# Patient Record
Sex: FEMALE | Race: BLACK OR AFRICAN AMERICAN | NOT HISPANIC OR LATINO | Employment: FULL TIME | URBAN - METROPOLITAN AREA
[De-identification: names, ages, dates, MRNs, and addresses within clinical notes are randomized per-mention and may not be internally consistent; named-entity substitution may affect disease eponyms.]

---

## 2021-09-23 LAB
BILIRUB SERPL-MCNC: NEGATIVE MG/DL
BLOOD URINE, POC: NEGATIVE
CLARITY, POC UA: CLEAR
COLOR, POC UA: YELLOW
GLUCOSE UR QL STRIP: NEGATIVE
KETONES UR QL STRIP: NEGATIVE
LEUKOCYTE EST, POC UA: NEGATIVE
NITRITE, POC UA: NEGATIVE
PH, POC UA: 5.5
POC BETA-HCG (QUAL): NEGATIVE
PROTEIN, POC: NORMAL
SPECIFIC GRAVITY, POC UA: NORMAL
UROBILINOGEN, POC UA: NORMAL

## 2022-04-12 ENCOUNTER — HISTORICAL (OUTPATIENT)
Dept: ADMINISTRATIVE | Facility: HOSPITAL | Age: 22
End: 2022-04-12
Payer: MEDICAID

## 2022-04-30 VITALS
HEIGHT: 66 IN | WEIGHT: 110.25 LBS | BODY MASS INDEX: 17.72 KG/M2 | DIASTOLIC BLOOD PRESSURE: 81 MMHG | SYSTOLIC BLOOD PRESSURE: 122 MMHG | OXYGEN SATURATION: 100 %

## 2022-06-20 LAB
ALBUMIN SERPL-MCNC: 4.6 GM/DL (ref 3.5–5)
ALBUMIN/GLOB SERPL: 1.5 RATIO (ref 1.1–2)
ALP SERPL-CCNC: 72 UNIT/L (ref 40–150)
ALT SERPL-CCNC: 10 UNIT/L (ref 0–55)
AST SERPL-CCNC: 17 UNIT/L (ref 5–34)
B-HCG UR QL: NEGATIVE
BASOPHILS # BLD AUTO: 0.08 X10(3)/MCL (ref 0–0.2)
BASOPHILS NFR BLD AUTO: 1 %
BILIRUBIN DIRECT+TOT PNL SERPL-MCNC: 0.4 MG/DL
BUN SERPL-MCNC: 9 MG/DL (ref 7–18.7)
CALCIUM SERPL-MCNC: 9.9 MG/DL (ref 8.4–10.2)
CHLORIDE SERPL-SCNC: 105 MMOL/L (ref 98–107)
CO2 SERPL-SCNC: 27 MMOL/L (ref 22–29)
CREAT SERPL-MCNC: 0.82 MG/DL (ref 0.55–1.02)
CTP QC/QA: YES
EOSINOPHIL # BLD AUTO: 0.39 X10(3)/MCL (ref 0–0.9)
EOSINOPHIL NFR BLD AUTO: 4.7 %
ERYTHROCYTE [DISTWIDTH] IN BLOOD BY AUTOMATED COUNT: 13.8 % (ref 11.5–17)
GLOBULIN SER-MCNC: 3.1 GM/DL (ref 2.4–3.5)
GLUCOSE SERPL-MCNC: 87 MG/DL (ref 74–100)
HCT VFR BLD AUTO: 38.7 % (ref 37–47)
HGB BLD-MCNC: 12.1 GM/DL (ref 12–16)
IMM GRANULOCYTES # BLD AUTO: 0.01 X10(3)/MCL (ref 0–0.02)
IMM GRANULOCYTES NFR BLD AUTO: 0.1 % (ref 0–0.43)
LIPASE SERPL-CCNC: 11 U/L
LYMPHOCYTES # BLD AUTO: 3.98 X10(3)/MCL (ref 0.6–4.6)
LYMPHOCYTES NFR BLD AUTO: 48 %
MCH RBC QN AUTO: 25.6 PG (ref 27–31)
MCHC RBC AUTO-ENTMCNC: 31.3 MG/DL (ref 33–36)
MCV RBC AUTO: 82 FL (ref 80–94)
MONOCYTES # BLD AUTO: 0.5 X10(3)/MCL (ref 0.1–1.3)
MONOCYTES NFR BLD AUTO: 6 %
NEUTROPHILS # BLD AUTO: 3.3 X10(3)/MCL (ref 2.1–9.2)
NEUTROPHILS NFR BLD AUTO: 40.2 %
NRBC BLD AUTO-RTO: 0 %
PLATELET # BLD AUTO: 237 X10(3)/MCL (ref 130–400)
PMV BLD AUTO: 10.9 FL (ref 9.4–12.4)
POTASSIUM SERPL-SCNC: 3.9 MMOL/L (ref 3.5–5.1)
PROT SERPL-MCNC: 7.7 GM/DL (ref 6.4–8.3)
RBC # BLD AUTO: 4.72 X10(6)/MCL (ref 4.2–5.4)
SODIUM SERPL-SCNC: 141 MMOL/L (ref 136–145)
WBC # SPEC AUTO: 8.3 X10(3)/MCL (ref 4.5–11.5)

## 2022-06-20 PROCEDURE — 83690 ASSAY OF LIPASE: CPT | Performed by: FAMILY MEDICINE

## 2022-06-20 PROCEDURE — 99284 EMERGENCY DEPT VISIT MOD MDM: CPT | Mod: 25

## 2022-06-20 PROCEDURE — 85025 COMPLETE CBC W/AUTO DIFF WBC: CPT | Performed by: FAMILY MEDICINE

## 2022-06-20 PROCEDURE — 80053 COMPREHEN METABOLIC PANEL: CPT | Performed by: FAMILY MEDICINE

## 2022-06-20 PROCEDURE — 81025 URINE PREGNANCY TEST: CPT | Performed by: STUDENT IN AN ORGANIZED HEALTH CARE EDUCATION/TRAINING PROGRAM

## 2022-06-20 PROCEDURE — 36415 COLL VENOUS BLD VENIPUNCTURE: CPT | Performed by: FAMILY MEDICINE

## 2022-06-21 ENCOUNTER — HOSPITAL ENCOUNTER (EMERGENCY)
Facility: HOSPITAL | Age: 22
Discharge: HOME | End: 2022-06-21
Attending: FAMILY MEDICINE
Payer: MEDICAID

## 2022-06-21 VITALS
RESPIRATION RATE: 19 BRPM | BODY MASS INDEX: 17.33 KG/M2 | SYSTOLIC BLOOD PRESSURE: 122 MMHG | TEMPERATURE: 99 F | OXYGEN SATURATION: 100 % | HEART RATE: 65 BPM | WEIGHT: 107.81 LBS | DIASTOLIC BLOOD PRESSURE: 64 MMHG | HEIGHT: 66 IN

## 2022-06-21 DIAGNOSIS — R10.9 ABDOMINAL PAIN, UNSPECIFIED ABDOMINAL LOCATION: Primary | ICD-10-CM

## 2022-06-21 RX ORDER — ONDANSETRON 4 MG/1
4 TABLET, FILM COATED ORAL EVERY 6 HOURS
Qty: 20 TABLET | Refills: 0 | Status: SHIPPED | OUTPATIENT
Start: 2022-06-21

## 2022-06-21 RX ORDER — FAMOTIDINE 20 MG/1
20 TABLET, FILM COATED ORAL 2 TIMES DAILY
Qty: 20 TABLET | Refills: 0 | Status: SHIPPED | OUTPATIENT
Start: 2022-06-21 | End: 2023-06-22

## 2022-06-21 RX ORDER — DICYCLOMINE HYDROCHLORIDE 20 MG/1
20 TABLET ORAL 2 TIMES DAILY
Qty: 20 TABLET | Refills: 0 | Status: SHIPPED | OUTPATIENT
Start: 2022-06-21 | End: 2022-07-01

## 2022-06-21 NOTE — Clinical Note
"Lefty Laraмарина" Arnaldo was seen and treated in our emergency department on 6/20/2022.  She may return to work on 06/22/2022.       If you have any questions or concerns, please don't hesitate to call.      GOSIA Alejandro RN    "

## 2022-06-21 NOTE — DISCHARGE INSTRUCTIONS
You came into the emergency department for abdominal pain.  Your labs were all normal.  Your pregnancy was negative.    You will be discharged with multiple medications including Pepcid (helps with acid reflux/GERD), Zofran (cells with nausea), Bentyl (helps with crampy abdominal pain and diarrhea).    Return to the emergency department for worsening abdominal pain that is not improving, not able to eat any food or water, nonstop vomiting, blood in her stool.  Otherwise please follow-up your primary care doctor for re-evaluation.

## 2022-06-21 NOTE — ED PROVIDER NOTES
Name: Lefty Mcintosh   Age: 22 y.o.  Sex: female    Chief complaint:   Chief Complaint   Patient presents with    Abdominal Pain     Pt reports intermittent abd pain/diarrhea x 2 weeks, abd pain after eating, vss.     Nausea    Diarrhea      Patient arrived with: Private  History obtained from: Patient    Subjective:   22-year-old female who presents to the emergency department for abdominal pain.  Patient said her symptoms have been going on for approximately 2 weeks.  Pain is diffuse in nature but worse in the epigastric.  Pain is crampy in sensation, waxes and wanes, nonradiating, usually worse after p.o. intake.  Complains of nausea with no episodes of vomiting.  Has alternating diarrhea and constipation.  Denies dysuria, hematuria.  Last menstrual period was 1 week ago and unchanged.  No odor or abnormal discharge.  Denies fever, chills, sweats, cough, sore throat, runny nose.    No past medical history on file.  No past surgical history on file.  Social History     Socioeconomic History    Marital status: Single   Tobacco Use    Smoking status: Never Smoker    Smokeless tobacco: Never Used     Review of patient's allergies indicates:  No Known Allergies     Review of Systems   Constitutional: Negative for diaphoresis and fever.   HENT: Negative for congestion and sore throat.    Eyes: Negative for pain and discharge.   Respiratory: Negative for cough and shortness of breath.    Cardiovascular: Negative for chest pain and palpitations.   Gastrointestinal: Positive for constipation and diarrhea. Negative for vomiting.   Genitourinary: Negative for dysuria and hematuria.   Musculoskeletal: Negative for back pain and myalgias.   Skin: Negative for itching and rash.   Neurological: Negative for weakness and headaches.          Objective:     Vitals:    06/20/22 2127   BP: 128/66   Pulse: 63   Resp: 18   Temp: 98.6 °F (37 °C)        Physical Exam  Constitutional:       Appearance: She is not toxic-appearing.    HENT:      Head: Normocephalic and atraumatic.   Cardiovascular:      Rate and Rhythm: Regular rhythm.      Pulses: Normal pulses.   Pulmonary:      Effort: Pulmonary effort is normal.      Breath sounds: Normal breath sounds.   Abdominal:      General: Abdomen is flat. Bowel sounds are normal.      Palpations: Abdomen is soft.      Tenderness: There is no right CVA tenderness or left CVA tenderness.   Musculoskeletal:         General: No deformity. Normal range of motion.      Cervical back: Normal range of motion and neck supple.   Skin:     General: Skin is warm and dry.   Neurological:      General: No focal deficit present.      Mental Status: She is alert and oriented to person, place, and time.   Psychiatric:         Mood and Affect: Mood normal.         Behavior: Behavior normal.          Records:  Nursing records and triage records reviewed  Prior records reviewed    Medical decision making:   Presents to emergency department for abdominal pain and alternating diarrhea and constipation.  Patient is stable and nontoxic appearing.  Vital signs are within normal limits.  Abdomen is soft with no tenderness to palpation.  Will get abdominal labs for evaluation.  Patient is comfortable does not need symptomatic management at this time.    ED Course as of 06/21/22 0128   Mon Jun 20, 2022   2232 My interpretation of labs.  No leukocytosis, anemia, thrombocytopenia.  No severe electrolyte abnormality.  No ADRI hyperglycemia.  Liver enzymes are within normal limits.  Pregnancy negative. [RK]      ED Course User Index  [RK] Morgan Izquierdo MD          EKG:       Procedures       Diagnosis:  Final diagnoses:  [R10.9] Abdominal pain, unspecified abdominal location (Primary)     ED Prescriptions     Medication Sig Dispense Start Date End Date Auth. Provider    dicyclomine (BENTYL) 20 mg tablet Take 1 tablet (20 mg total) by mouth 2 (two) times daily. for 10 days 20 tablet 6/21/2022 7/1/2022 Morgan Izquierdo,  MD    ondansetron (ZOFRAN) 4 MG tablet Take 1 tablet (4 mg total) by mouth every 6 (six) hours. 20 tablet 6/21/2022  Morgan Izquierdo MD    famotidine (PEPCID) 20 MG tablet Take 1 tablet (20 mg total) by mouth 2 (two) times daily. for 10 days 20 tablet 6/21/2022 7/1/2022 Morgan Izquierdo MD        Follow-up Information     Follow up With Specialties Details Why Contact Info    PCP  Schedule an appointment as soon as possible for a visit in 1 week Follow up with your primary care doctor for re-evaluation Follow up with your primary care doctor for re-evaluation          Morgan Izquierdo M.D.  Emergency Medicine Physician     (Please note that this chart was completed via voice to text dictation. There may be typographical errors or substitutions that are unintentional, or uncorrected. Every attempt was made to proofread the chart prior to completion. If there are any questions, please contact the physician for final clarification).       Morgan Izquierdo MD  06/21/22 0129

## 2022-09-20 ENCOUNTER — HOSPITAL ENCOUNTER (EMERGENCY)
Facility: HOSPITAL | Age: 22
Discharge: HOME | End: 2022-09-20
Attending: SPECIALIST
Payer: MEDICAID

## 2022-09-20 VITALS
SYSTOLIC BLOOD PRESSURE: 109 MMHG | TEMPERATURE: 100 F | DIASTOLIC BLOOD PRESSURE: 76 MMHG | BODY MASS INDEX: 17.68 KG/M2 | HEART RATE: 122 BPM | OXYGEN SATURATION: 98 % | WEIGHT: 110 LBS | HEIGHT: 66 IN | RESPIRATION RATE: 20 BRPM

## 2022-09-20 DIAGNOSIS — J06.9 VIRAL URI WITH COUGH: Primary | ICD-10-CM

## 2022-09-20 PROCEDURE — 25000003 PHARM REV CODE 250: Performed by: SPECIALIST

## 2022-09-20 PROCEDURE — 96372 THER/PROPH/DIAG INJ SC/IM: CPT | Performed by: SPECIALIST

## 2022-09-20 PROCEDURE — 99284 EMERGENCY DEPT VISIT MOD MDM: CPT

## 2022-09-20 PROCEDURE — 63600175 PHARM REV CODE 636 W HCPCS: Performed by: SPECIALIST

## 2022-09-20 RX ORDER — DEXAMETHASONE SODIUM PHOSPHATE 4 MG/ML
4 INJECTION, SOLUTION INTRA-ARTICULAR; INTRALESIONAL; INTRAMUSCULAR; INTRAVENOUS; SOFT TISSUE
Status: COMPLETED | OUTPATIENT
Start: 2022-09-20 | End: 2022-09-20

## 2022-09-20 RX ORDER — GUAIFENESIN/DEXTROMETHORPHAN 100-10MG/5
10 SYRUP ORAL ONCE
Status: COMPLETED | OUTPATIENT
Start: 2022-09-20 | End: 2022-09-20

## 2022-09-20 RX ADMIN — DEXAMETHASONE SODIUM PHOSPHATE 4 MG: 4 INJECTION, SOLUTION INTRA-ARTICULAR; INTRALESIONAL; INTRAMUSCULAR; INTRAVENOUS; SOFT TISSUE at 10:09

## 2022-09-20 RX ADMIN — GUAIFENESIN AND DEXTROMETHORPHAN 10 ML: 100; 10 SYRUP ORAL at 10:09

## 2022-09-21 NOTE — ED PROVIDER NOTES
Encounter Date: 9/20/2022       History     Chief Complaint   Patient presents with    Cough     Patient recently diagnosed with URI yesterday. Covid and strep -. States she had 2 episodes with blood in her sputum after coughing.      Patient seen earlier today at a walk-in clinic and was diagnosed with upper respiratory infection and given prescriptions of Z-Dash, Medrol Dosepak and a cough syrup; these prescriptions have not been filled; patient continues to have cough and noticed some blood tinged sputum; no fever    The history is provided by the patient and a parent.   Review of patient's allergies indicates:  Not on File  History reviewed. No pertinent past medical history.  History reviewed. No pertinent surgical history.  History reviewed. No pertinent family history.     Review of Systems   Constitutional: Negative.    HENT: Negative.     Respiratory: Negative.     Cardiovascular: Negative.    Gastrointestinal: Negative.    Musculoskeletal: Negative.    Skin: Negative.    Neurological: Negative.    All other systems reviewed and are negative.    Physical Exam     Initial Vitals [09/20/22 2212]   BP Pulse Resp Temp SpO2   109/76 (!) 122 20 99.6 °F (37.6 °C) 98 %      MAP       --         Physical Exam    Nursing note and vitals reviewed.  Constitutional: She appears well-developed and well-nourished.   HENT:   Head: Normocephalic and atraumatic.   Nose: Nose normal.   Mouth/Throat: Oropharynx is clear and moist.   Eyes: EOM are normal. Pupils are equal, round, and reactive to light.   Neck: Neck supple.   Normal range of motion.  Cardiovascular:  Regular rhythm, normal heart sounds and intact distal pulses.   Tachycardia present.         Pulmonary/Chest: Breath sounds normal. No respiratory distress. She has no wheezes.   Abdominal: Abdomen is soft. Bowel sounds are normal.   Musculoskeletal:         General: Normal range of motion.      Cervical back: Normal range of motion and neck supple.     Neurological:  "She is alert and oriented to person, place, and time. She has normal strength.   Skin: Skin is warm and dry.       ED Course   Procedures  Labs Reviewed - No data to display       Imaging Results    None          Medications   dexamethasone injection 4 mg (4 mg Intramuscular Given 9/20/22 2234)   dextromethorphan-guaiFENesin  mg/5 ml liquid 10 mL (10 mLs Oral Given 9/20/22 2234)                            Patient Vitals for the past 24 hrs:   BP Temp Pulse Resp SpO2 Height Weight   09/20/22 2212 109/76 99.6 °F (37.6 °C) (!) 122 20 98 % 5' 6" (1.676 m) 110 lb (49.9 kg)   Patient and her mother agree to  the prescriptions in the morning    Clinical Impression:   Final diagnoses:  [J06.9] Viral URI with cough (Primary)        ED Disposition Condition    Discharge Stable          ED Prescriptions    None       Follow-up Information       Follow up With Specialties Details Why Contact Info    [facility] - Emergency Dept Emergency Medicine  As needed [address] [address] [phone]             Rodney Orellana MD  09/20/22 5158    "

## 2022-09-21 NOTE — ED NOTES
Patient c/o sore throat and coughing that started on yesterday. She went to urgent care on yesterday and was tested for covid and strept, results were negative. Denies body aches and shortness of breath. No acute distress noted.

## 2022-09-22 ENCOUNTER — HISTORICAL (OUTPATIENT)
Dept: ADMINISTRATIVE | Facility: HOSPITAL | Age: 22
End: 2022-09-22
Payer: MEDICAID

## 2022-10-30 ENCOUNTER — HOSPITAL ENCOUNTER (EMERGENCY)
Facility: HOSPITAL | Age: 22
Discharge: HOME | End: 2022-10-30
Attending: STUDENT IN AN ORGANIZED HEALTH CARE EDUCATION/TRAINING PROGRAM
Payer: MEDICAID

## 2022-10-30 VITALS
HEIGHT: 66 IN | TEMPERATURE: 99 F | BODY MASS INDEX: 17.68 KG/M2 | WEIGHT: 110 LBS | SYSTOLIC BLOOD PRESSURE: 138 MMHG | HEART RATE: 92 BPM | OXYGEN SATURATION: 100 % | RESPIRATION RATE: 18 BRPM | DIASTOLIC BLOOD PRESSURE: 89 MMHG

## 2022-10-30 DIAGNOSIS — Z34.90 PREGNANCY, UNSPECIFIED GESTATIONAL AGE: Primary | ICD-10-CM

## 2022-10-30 DIAGNOSIS — Z32.01 POSITIVE PREGNANCY TEST: ICD-10-CM

## 2022-10-30 LAB — B-HCG SERPL QL: POSITIVE

## 2022-10-30 PROCEDURE — 99282 EMERGENCY DEPT VISIT SF MDM: CPT

## 2022-10-30 PROCEDURE — 81025 URINE PREGNANCY TEST: CPT | Performed by: STUDENT IN AN ORGANIZED HEALTH CARE EDUCATION/TRAINING PROGRAM

## 2022-10-31 NOTE — ED PROVIDER NOTES
Encounter Date: 10/30/2022       History     Chief Complaint   Patient presents with    Possible Pregnancy     Pt reports positive home test yesterday, lmp Sep 19. Pt reports that she needs pregnancy test results from hospital for work documentation.      Patient is a 22-year-old  female  at approximately 6 weeks gestation who presented to the ER today eating a positive pregnancy test.  Patient states she already knows she is pregnant but her work is requiring 1 from hospital.  Patient denies any nausea, vomiting, fever, chills, chest pain, shortness of breath abdominal pain, diarrhea, constipation.    Review of patient's allergies indicates:  No Known Allergies  No past medical history on file.  No past surgical history on file.  No family history on file.  Tobacco Use    Smokeless tobacco: Never     Review of Systems   Constitutional:  Negative for chills, fatigue and fever.   HENT:  Negative for congestion, sore throat and trouble swallowing.    Eyes:  Negative for pain and visual disturbance.   Respiratory:  Negative for cough, shortness of breath and wheezing.    Cardiovascular:  Negative for chest pain and palpitations.   Gastrointestinal:  Negative for abdominal pain, blood in stool, constipation, diarrhea, nausea and vomiting.   Genitourinary:  Negative for dysuria and hematuria.   Musculoskeletal:  Negative for back pain and myalgias.   Skin:  Negative for rash and wound.   Neurological:  Negative for seizures, syncope and headaches.   Psychiatric/Behavioral:  Negative for confusion. The patient is not nervous/anxious.      Physical Exam     Initial Vitals [10/30/22 2021]   BP Pulse Resp Temp SpO2   138/89 92 18 98.6 °F (37 °C) 100 %      MAP       --         Physical Exam    Nursing note and vitals reviewed.  Constitutional: She appears well-developed and well-nourished. She is not diaphoretic. No distress.   HENT:   Head: Normocephalic.   Right Ear: External ear normal.   Left Ear:  External ear normal.   Nose: Nose normal.   Eyes: Conjunctivae and EOM are normal. Right eye exhibits no discharge. Left eye exhibits no discharge. No scleral icterus.   Neck:   Normal range of motion.  Cardiovascular:  Normal rate, regular rhythm and normal heart sounds.     Exam reveals no gallop and no friction rub.       No murmur heard.  Pulmonary/Chest: Breath sounds normal. No stridor. No respiratory distress. She has no wheezes. She has no rhonchi. She has no rales.   Abdominal: Abdomen is soft. She exhibits no distension. There is no abdominal tenderness. There is no rebound and no guarding.   Musculoskeletal:         General: No tenderness or edema. Normal range of motion.      Cervical back: Normal range of motion.     Neurological: She is alert. No cranial nerve deficit.   Skin: Skin is warm. No rash noted. No erythema.   Psychiatric: She has a normal mood and affect. Her behavior is normal.       ED Course   Procedures  Labs Reviewed   PREGNANCY TEST, URINE RAPID - Abnormal; Notable for the following components:       Result Value    Beta hCG Qualitative, Urine Positive (*)     All other components within normal limits          Imaging Results    None          Medications - No data to display  Medical Decision Making:   Initial Assessment:   Overall well-appearing 22-year-old female  Clinical Tests:   Lab Tests: Ordered and Reviewed  The following lab test(s) were unremarkable: UPT  ED Management:  Vital signs stable patient is afebrile  Positive pregnancy test provided to patient   Take prenatal vitamin daily   Call OBGYN for an appointment   Return precautions discussed and follow-up PCP is recommended                        Clinical Impression:   Final diagnoses:  [Z34.90] Pregnancy, unspecified gestational age (Primary)  [Z32.01] Positive pregnancy test        ED Disposition Condition    Discharge Stable          ED Prescriptions    None       Follow-up Information       Follow up With Specialties  Details Why Contact Info    [facility] - Emergency Dept Emergency Medicine  If symptoms worsen [address] [facility] [id number] [phone]             Delio Wheeler MD  10/30/22 9096

## 2022-11-01 ENCOUNTER — HOSPITAL ENCOUNTER (EMERGENCY)
Facility: HOSPITAL | Age: 22
Discharge: HOME | End: 2022-11-02
Attending: STUDENT IN AN ORGANIZED HEALTH CARE EDUCATION/TRAINING PROGRAM
Payer: MEDICAID

## 2022-11-01 DIAGNOSIS — N93.9 VAGINAL BLEEDING: ICD-10-CM

## 2022-11-01 DIAGNOSIS — O20.0 THREATENED MISCARRIAGE: Primary | ICD-10-CM

## 2022-11-01 DIAGNOSIS — O03.9 MISCARRIAGE: ICD-10-CM

## 2022-11-01 LAB
ALBUMIN SERPL-MCNC: 4.7 GM/DL (ref 3.5–5)
ALBUMIN/GLOB SERPL: 1.3 RATIO (ref 1.1–2)
ALP SERPL-CCNC: 72 UNIT/L (ref 40–150)
ALT SERPL-CCNC: 16 UNIT/L (ref 0–55)
APPEARANCE UR: CLEAR
AST SERPL-CCNC: 21 UNIT/L (ref 5–34)
B-HCG FREE SERPL-ACNC: 5116.97 MIU/ML
B-HCG SERPL QL: POSITIVE
BACTERIA #/AREA URNS AUTO: ABNORMAL /HPF
BASOPHILS # BLD AUTO: 0.08 X10(3)/MCL (ref 0–0.2)
BASOPHILS NFR BLD AUTO: 0.7 %
BILIRUB UR QL STRIP.AUTO: NEGATIVE MG/DL
BILIRUBIN DIRECT+TOT PNL SERPL-MCNC: 0.3 MG/DL
BUN SERPL-MCNC: 13.3 MG/DL (ref 7–18.7)
CALCIUM SERPL-MCNC: 9.6 MG/DL (ref 8.4–10.2)
CHLORIDE SERPL-SCNC: 105 MMOL/L (ref 98–107)
CO2 SERPL-SCNC: 24 MMOL/L (ref 22–29)
COLOR UR AUTO: YELLOW
CREAT SERPL-MCNC: 0.89 MG/DL (ref 0.55–1.02)
EOSINOPHIL # BLD AUTO: 0.32 X10(3)/MCL (ref 0–0.9)
EOSINOPHIL NFR BLD AUTO: 2.8 %
ERYTHROCYTE [DISTWIDTH] IN BLOOD BY AUTOMATED COUNT: 13.7 % (ref 11.5–17)
GFR SERPLBLD CREATININE-BSD FMLA CKD-EPI: >60 MLS/MIN/1.73/M2
GLOBULIN SER-MCNC: 3.5 GM/DL (ref 2.4–3.5)
GLUCOSE SERPL-MCNC: 84 MG/DL (ref 74–100)
GLUCOSE UR QL STRIP.AUTO: NEGATIVE MG/DL
GROUP & RH: NORMAL
HCT VFR BLD AUTO: 37 % (ref 37–47)
HGB BLD-MCNC: 11.9 GM/DL (ref 12–16)
IMM GRANULOCYTES # BLD AUTO: 0.02 X10(3)/MCL (ref 0–0.04)
IMM GRANULOCYTES NFR BLD AUTO: 0.2 %
KETONES UR QL STRIP.AUTO: NEGATIVE MG/DL
LEUKOCYTE ESTERASE UR QL STRIP.AUTO: ABNORMAL UNIT/L
LYMPHOCYTES # BLD AUTO: 3.68 X10(3)/MCL (ref 0.6–4.6)
LYMPHOCYTES NFR BLD AUTO: 32.1 %
MCH RBC QN AUTO: 26.2 PG (ref 27–31)
MCHC RBC AUTO-ENTMCNC: 32.2 MG/DL (ref 33–36)
MCV RBC AUTO: 81.3 FL (ref 80–94)
MONOCYTES # BLD AUTO: 0.78 X10(3)/MCL (ref 0.1–1.3)
MONOCYTES NFR BLD AUTO: 6.8 %
NEUTROPHILS # BLD AUTO: 6.6 X10(3)/MCL (ref 2.1–9.2)
NEUTROPHILS NFR BLD AUTO: 57.4 %
NITRITE UR QL STRIP.AUTO: NEGATIVE
NRBC BLD AUTO-RTO: 0 %
PH UR STRIP.AUTO: 6.5 [PH]
PLATELET # BLD AUTO: 233 X10(3)/MCL (ref 130–400)
PMV BLD AUTO: 11.2 FL (ref 7.4–10.4)
POTASSIUM SERPL-SCNC: 3.6 MMOL/L (ref 3.5–5.1)
PROT SERPL-MCNC: 8.2 GM/DL (ref 6.4–8.3)
PROT UR QL STRIP.AUTO: ABNORMAL MG/DL
RBC # BLD AUTO: 4.55 X10(6)/MCL (ref 4.2–5.4)
RBC #/AREA URNS AUTO: <5 /HPF
RBC UR QL AUTO: ABNORMAL UNIT/L
SODIUM SERPL-SCNC: 136 MMOL/L (ref 136–145)
SP GR UR STRIP.AUTO: 1.03 (ref 1–1.03)
SQUAMOUS #/AREA URNS AUTO: <5 /HPF
UROBILINOGEN UR STRIP-ACNC: 1 MG/DL
WBC # SPEC AUTO: 11.5 X10(3)/MCL (ref 4.5–11.5)
WBC #/AREA URNS AUTO: 6 /HPF

## 2022-11-01 PROCEDURE — 86901 BLOOD TYPING SEROLOGIC RH(D): CPT | Performed by: EMERGENCY MEDICINE

## 2022-11-01 PROCEDURE — 36415 COLL VENOUS BLD VENIPUNCTURE: CPT

## 2022-11-01 PROCEDURE — 84702 CHORIONIC GONADOTROPIN TEST: CPT

## 2022-11-01 PROCEDURE — 36415 COLL VENOUS BLD VENIPUNCTURE: CPT | Performed by: EMERGENCY MEDICINE

## 2022-11-01 PROCEDURE — 81001 URINALYSIS AUTO W/SCOPE: CPT

## 2022-11-01 PROCEDURE — 81025 URINE PREGNANCY TEST: CPT

## 2022-11-01 PROCEDURE — 99284 EMERGENCY DEPT VISIT MOD MDM: CPT | Mod: 25

## 2022-11-01 PROCEDURE — 85025 COMPLETE CBC W/AUTO DIFF WBC: CPT

## 2022-11-01 PROCEDURE — 80053 COMPREHEN METABOLIC PANEL: CPT

## 2022-11-02 VITALS
OXYGEN SATURATION: 100 % | DIASTOLIC BLOOD PRESSURE: 59 MMHG | RESPIRATION RATE: 20 BRPM | WEIGHT: 110 LBS | SYSTOLIC BLOOD PRESSURE: 112 MMHG | HEIGHT: 66 IN | HEART RATE: 72 BPM | TEMPERATURE: 98 F | BODY MASS INDEX: 17.68 KG/M2

## 2022-11-02 NOTE — FIRST PROVIDER EVALUATION
"Medical screening examination initiated.  I have conducted a focused provider triage encounter, findings are as follows:    Brief history of present illness:  22 year old female presents to ER with c/o vaginal bleeding. LMP 22.     Vitals:    22   BP: 116/69   Pulse: 76   Resp: 16   Temp: 99.1 °F (37.3 °C)   SpO2: 100%   Weight: 49.9 kg (110 lb)   Height: 5' 6" (1.676 m)       Pertinent physical exam:  Awake and alert, NAD    Brief workup plan:  Labs, urine, upt     Preliminary workup initiated; this workup will be continued and followed by the physician or advanced practice provider that is assigned to the patient when roomed.  "

## 2022-11-02 NOTE — ED PROVIDER NOTES
Encounter Date: 11/1/2022       History     Chief Complaint   Patient presents with    Vaginal Bleeding     Pt c/o vaginal bleeding/spotting and recently found out she was pregnant. LMP 9/19/22. Denies abd pain/cramping.     Patient is a 22-year-old female  that presents with vaginal bleeding that has been present today. Associated symptoms abdominal cramp. Surrounding information is currently pregnant. Exacerbated by nothing. Relieved by nothing. Patient treatment prior to arrival none. Risk factors include none. Other history pertaining to this complaint nothing .       The history is provided by the patient. No  was used.   Review of patient's allergies indicates:  No Known Allergies  Past Medical History:   Diagnosis Date    Known health problems: none      Past Surgical History:   Procedure Laterality Date    none       History reviewed. No pertinent family history.  Social History     Tobacco Use    Smoking status: Never    Smokeless tobacco: Never   Substance Use Topics    Alcohol use: Not Currently    Drug use: Not Currently     Review of Systems   Constitutional:  Negative for fever.   Respiratory:  Negative for cough and shortness of breath.    Cardiovascular:  Negative for chest pain.   Gastrointestinal:  Negative for abdominal pain.   Genitourinary:  Positive for vaginal bleeding. Negative for difficulty urinating and dysuria.   Musculoskeletal:  Negative for gait problem.   Skin:  Negative for color change.   Neurological:  Negative for dizziness, speech difficulty and headaches.   Psychiatric/Behavioral:  Negative for hallucinations and suicidal ideas.    All other systems reviewed and are negative.    Physical Exam     Initial Vitals [11/01/22 2001]   BP Pulse Resp Temp SpO2   116/69 76 16 99.1 °F (37.3 °C) 100 %      MAP       --         Physical Exam    Nursing note and vitals reviewed.  Constitutional: She appears well-developed and well-nourished.   HENT:   Head: Normocephalic.    Eyes: EOM are normal.   Neck:   Normal range of motion.  Cardiovascular:  Normal rate, regular rhythm, normal heart sounds and intact distal pulses.           Pulmonary/Chest: Breath sounds normal. No respiratory distress.   Abdominal: Abdomen is soft. Bowel sounds are normal. There is no abdominal tenderness.   Musculoskeletal:         General: Normal range of motion.      Cervical back: Normal range of motion.     Neurological: She is alert and oriented to person, place, and time. She has normal strength.   Skin: Skin is warm and dry.   Psychiatric: She has a normal mood and affect. Her behavior is normal. Judgment and thought content normal.       ED Course   Procedures  Labs Reviewed   PREGNANCY TEST, URINE RAPID - Abnormal; Notable for the following components:       Result Value    Beta hCG Qualitative, Urine Positive (*)     All other components within normal limits   URINALYSIS, REFLEX TO URINE CULTURE - Abnormal; Notable for the following components:    Protein, UA Trace (*)     Blood, UA 3+ (*)     Leukocyte Esterase, UA Trace (*)     All other components within normal limits   HCG, QUANTITATIVE - Abnormal; Notable for the following components:    Beta Human Chorionic Gonadotropin Quantitative 5,116.97 (*)     All other components within normal limits   CBC WITH DIFFERENTIAL - Abnormal; Notable for the following components:    Hgb 11.9 (*)     MCH 26.2 (*)     MCHC 32.2 (*)     MPV 11.2 (*)     All other components within normal limits   URINALYSIS, MICROSCOPIC - Abnormal; Notable for the following components:    WBC, UA 6 (*)     All other components within normal limits   CBC W/ AUTO DIFFERENTIAL    Narrative:     The following orders were created for panel order CBC Auto Differential.  Procedure                               Abnormality         Status                     ---------                               -----------         ------                     CBC with Differential[[id number]]         Abnormal            Final result                 Please view results for these tests on the individual orders.   COMPREHENSIVE METABOLIC PANEL   GROUP & RH          Imaging Results              US OB <14 Wks, TransAbd, Single Gestation (Final result)  Result time 11/02/22 11:54:04   Procedure changed from US OB <14 Wks TransAbd & TransVag, Single Gestation (XPD)     Final result by Mike Christian MD (11/02/22 11:54:04)                   Impression:    Impression:    1. A gravid uterus is present with a gestational sac-like structure. The presumed gestational sac measures 5.4 mm which corresponds to an ultrasound estimated gestational age of 5 week 0 days. No yolk sac or fetal pole is identified at this time.    2. Recommend serial interval clinical laboratory and ultrasound follow up to confirm viable pregnancy. Details and other findings as noted above.    No significant discrepancy with overnight report.      Electronically signed by: Mike Christian  Date:    11/02/2022  Time:    11:54               Narrative:      Technique:First trimester ultrasound of the pelvis was performed with transabdominal images being obtained.    Comparison:None.    Clinical history:Vaginal bleeding.    FINDINGS:    Uterus:The uterus appears unremarkable. The uterus measures 6.1 cm in sagittal dimension by 4.2 cm in transverse dimension by 4.7 cm in AP dimension.    Intrauterine gestation:A gravid uterus is present with a gestational sac-like structure. The presumed gestational sac measures 5.4 mm which corresponds to an ultrasound estimated gestational age of 5 week 0 days. No yolk sac or fetal pole is identified at this time.    Gestational age:The LMP is 09/19/2022. The gestational age by LMP is 6 weeks 1 days. The Estimated Date of Confinement based on menstrual period is 06/26/2023.    Adnexa:No adnexal masses are seen.    Right Ovary:The right ovary is not visualized.    Left Ovary:The left ovary measures 3.1 x 2.1 x 2.1 cm. A  large likely corpus luteum cyst is seen which measures 1.6 x 1.3 x 1.1 cm.    Fluid:No free fluid is seen in the pelvis.                        Preliminary result by Mike Christian MD (11/02/22 00:44:49)                   Narrative:    START OF REPORT:  Technique: First trimester ultrasound of the pelvis was performed with transabdominal images being obtained.    Comparison: None.    Clinical history: Vaginal bleeding.    FINDINGS:  Uterus: The uterus appears unremarkable. The uterus measures 6.1 cm in sagittal dimension by 4.2 cm in transverse dimension by 4.7 cm in AP dimension.  Intrauterine gestation: A gravid uterus is present with a gestational sac-like structure. The presumed gestational sac measures 5.4 mm which corresponds to an ultrasound estimated gestational age of 5 week 0 days. No yolk sac or fetal pole is identified at this time.  Gestational age: The LMP is 09/19/2022. The gestational age by LMP is 6 weeks 1 days. The Estimated Date of Confinement based on menstrual period is 06/26/2023.    Adnexa: No adnexal masses are seen.  Right Ovary: The right ovary is not visualized.  Left Ovary: The left ovary measures 3.1 x 2.1 x 2.1 cm. A large likely corpus luteum cyst is seen which measures 1.6 x 1.3 x 1.1 cm.    Fluid: No free fluid is seen in the pelvis.      Impression:  1. A gravid uterus is present with a gestational sac-like structure. The presumed gestational sac measures 5.4 mm which corresponds to an ultrasound estimated gestational age of 5 week 0 days. No yolk sac or fetal pole is identified at this time.  2. Recommend serial interval clinical laboratory and ultrasound follow up to confirm viable pregnancy. Details and other findings as noted above.                          Preliminary result by Jerry Cali Jr., MD (11/02/22 00:44:49)                   Narrative:    START OF REPORT:  Technique: First trimester ultrasound of the pelvis was performed with transabdominal images being  obtained.    Comparison: None.    Clinical history: Vaginal bleeding.    FINDINGS:  Uterus: The uterus appears unremarkable. The uterus measures 6.1 cm in sagittal dimension by 4.2 cm in transverse dimension by 4.7 cm in AP dimension.  Intrauterine gestation: A gravid uterus is present with a gestational sac-like structure. The presumed gestational sac measures 5.4 mm which corresponds to an ultrasound estimated gestational age of 5 week 0 days. No yolk sac or fetal pole is identified at this time.  Gestational age: The LMP is 09/19/2022. The gestational age by LMP is 6 weeks 1 days. The Estimated Date of Confinement based on menstrual period is 06/26/2023.    Adnexa: No adnexal masses are seen.  Right Ovary: The right ovary is not visualized.  Left Ovary: The left ovary measures 3.1 x 2.1 x 2.1 cm. A large likely corpus luteum cyst is seen which measures 1.6 x 1.3 x 1.1 cm.    Fluid: No free fluid is seen in the pelvis.      Impression:  1. A gravid uterus is present with a gestational sac-like structure. The presumed gestational sac measures 5.4 mm which corresponds to an ultrasound estimated gestational age of 5 week 0 days. No yolk sac or fetal pole is identified at this time.  2. Recommend serial interval clinical laboratory and ultrasound follow up to confirm viable pregnancy. Details and other findings as noted above.                                         Medications - No data to display              ED Course as of 11/02/22 1741   Wed Nov 02, 2022   0038 Needs to follow up with OB Dr Mcarthur for repeat HCG level and u/s. [CL]   0311 HCG Quant(!): 5,116.97 [RP]      ED Course User Index  [CL] TONI Fuentes  [RP] Skyler Benitez MD                 Clinical Impression:   Final diagnoses:  [O20.0] Threatened miscarriage (Primary)  [N93.9] Vaginal bleeding  [O03.9] Miscarriage      ED Disposition Condition    Discharge Stable          ED Prescriptions    None       Follow-up Information       Follow up  With Specialties Details Why Contact Info    Your Obstetrician/Gynecologist  Call in 2 days ed follow up     Santhosh Mcarthur MD Obstetrics   [address]  [facility], [address]  [facility] [address]  [phone]               TONI Fuentes  11/02/22 0039       TONI Fuentes  11/02/22 4629

## 2022-11-02 NOTE — DISCHARGE INSTRUCTIONS
Your B-HCG or pregnancy hormone level today was 5100.   You will need a repeat hormone level in 2 days.      Follow up with Dr. Mcarthur.     Return to the emergency department if you have any worsening or severe pain, fever, nausea, vomiting, difficulty breathing, or any other symptoms.

## 2022-11-02 NOTE — ED TRIAGE NOTES
(Pt c/o vaginal bleeding/spotting and recently found out she was pregnant. LMP 9/19/22. Denies abd pain/cramping

## 2022-11-02 NOTE — ED NOTES
Assumed care of patient at this time. Pt denies needs, ambulated with steady gait to bathroom. NAD at this time. Will continue to monitor.

## 2023-02-14 DIAGNOSIS — Z12.4 ENCOUNTER FOR PAPANICOLAOU SMEAR OF CERVIX: Primary | ICD-10-CM

## 2023-06-20 ENCOUNTER — PATIENT MESSAGE (OUTPATIENT)
Dept: RESEARCH | Facility: HOSPITAL | Age: 23
End: 2023-06-20
Payer: MEDICAID

## 2023-06-22 ENCOUNTER — OFFICE VISIT (OUTPATIENT)
Dept: URGENT CARE | Facility: CLINIC | Age: 23
End: 2023-06-22
Payer: MEDICAID

## 2023-06-22 VITALS
OXYGEN SATURATION: 98 % | DIASTOLIC BLOOD PRESSURE: 82 MMHG | RESPIRATION RATE: 16 BRPM | WEIGHT: 112.5 LBS | TEMPERATURE: 98 F | BODY MASS INDEX: 19.21 KG/M2 | SYSTOLIC BLOOD PRESSURE: 117 MMHG | HEART RATE: 83 BPM | HEIGHT: 64 IN

## 2023-06-22 DIAGNOSIS — R10.9 ABDOMINAL PAIN, UNSPECIFIED ABDOMINAL LOCATION: ICD-10-CM

## 2023-06-22 DIAGNOSIS — N89.8 VAGINAL DISCHARGE: Primary | ICD-10-CM

## 2023-06-22 PROBLEM — S06.9X0A TRAUMATIC BRAIN INJURY WITHOUT LOSS OF CONSCIOUSNESS: Status: ACTIVE | Noted: 2023-06-22

## 2023-06-22 PROBLEM — J30.9 ALLERGIC RHINITIS: Status: ACTIVE | Noted: 2021-02-05

## 2023-06-22 LAB
B-HCG SERPL QL: NEGATIVE
C TRACH DNA SPEC QL NAA+PROBE: NOT DETECTED
CLUE CELLS VAG QL WET PREP: NORMAL
N GONORRHOEA DNA SPEC QL NAA+PROBE: NOT DETECTED
T VAGINALIS VAG QL WET PREP: NORMAL
WBC #/AREA VAG WET PREP: NORMAL
YEAST SPEC QL WET PREP: NORMAL

## 2023-06-22 PROCEDURE — 81025 URINE PREGNANCY TEST: CPT | Performed by: NURSE PRACTITIONER

## 2023-06-22 PROCEDURE — 99213 PR OFFICE/OUTPT VISIT, EST, LEVL III, 20-29 MIN: ICD-10-PCS | Mod: S$PBB,,, | Performed by: NURSE PRACTITIONER

## 2023-06-22 PROCEDURE — 99214 OFFICE O/P EST MOD 30 MIN: CPT | Mod: PBBFAC | Performed by: NURSE PRACTITIONER

## 2023-06-22 PROCEDURE — 99213 OFFICE O/P EST LOW 20 MIN: CPT | Mod: S$PBB,,, | Performed by: NURSE PRACTITIONER

## 2023-06-22 PROCEDURE — 87591 N.GONORRHOEAE DNA AMP PROB: CPT | Performed by: NURSE PRACTITIONER

## 2023-06-22 PROCEDURE — 87210 SMEAR WET MOUNT SALINE/INK: CPT | Performed by: NURSE PRACTITIONER

## 2023-06-22 RX ORDER — FLUCONAZOLE 200 MG/1
200 TABLET ORAL
COMMUNITY
Start: 2023-06-10

## 2023-06-22 RX ORDER — VALACYCLOVIR HYDROCHLORIDE 1 G/1
1000 TABLET, FILM COATED ORAL 2 TIMES DAILY
COMMUNITY
Start: 2023-05-24 | End: 2023-06-22

## 2023-06-22 RX ORDER — METRONIDAZOLE 500 MG/1
500 TABLET ORAL 2 TIMES DAILY
COMMUNITY
Start: 2023-06-10

## 2023-06-22 NOTE — PATIENT INSTRUCTIONS
- abstain from sex until all results are known  - urine tests take 1 day to result  - vaginal swab tests are a same day result  - blood tests are a same day result  - this clinic will ONLY call you for POSITIVE = ABNORMAL results. We will not call you to tell you tests are NEGATIVE = NORMAL.  - if you need medication to treat any conditions, it was either prescribed to you today and sent to your pharmacy, or it will be sent when providers view abnormal results.  - if any of your tests are abnormal, we will call you with a plan of care.  - always require condoms  - partners will need treatment for any +STDs on your testing. They have to have their own visit, we do not automatically treat them.

## 2023-06-22 NOTE — LETTER
June 22, 2023      [facility]  [facility] [facility] [address]  Phone: [phone]       Patient: Lefty Mcintosh   YOB: 2000  Date of Visit: 06/22/2023    To Whom It May Concern:    Carolina Mcintosh  was at [facility] on 06/22/2023. The patient may return to work/school on 6/23/23. If you have any questions or concerns, or if I can be of further assistance, please do not hesitate to contact me.    Sincerely,    SHERYL Newton NP

## 2023-06-27 ENCOUNTER — PATIENT MESSAGE (OUTPATIENT)
Dept: RESEARCH | Facility: HOSPITAL | Age: 23
End: 2023-06-27
Payer: MEDICAID

## 2023-08-17 NOTE — PROGRESS NOTES
"Subjective:      Patient ID: Lefty Mcintosh is a 23 y.o. female.    Vitals:  height is 5' 4.17" (1.63 m) and weight is 51 kg (112 lb 8 oz). Her temperature is 98.1 °F (36.7 °C). Her blood pressure is 117/82 and her pulse is 83. Her respiration is 16 and oxygen saturation is 98%.     Chief Complaint: Dx with BV ~ 6/7 (Seen at [facility] called with results and Rx for BV (did not complete Rx.) Also states given Rocephin injection at time of visit. Does not know results of all testing, c/o of persistent discharge/itching. )    HPI as stated in CC. 6/10/23 rec'd Diflucan x3 doses and Flagyl. May 2023 took Valacyclovir. Pt is fine with repeat testing.  ROS   Objective:     Physical Exam   Constitutional: She is oriented to person, place, and time.  Non-toxic appearance. She does not appear ill. No distress. normal  Eyes: Conjunctivae are normal.   Pulmonary/Chest: Effort normal.   Abdominal: Normal appearance.   Musculoskeletal: Normal range of motion.         General: Normal range of motion.   Neurological: She is alert and oriented to person, place, and time.   Skin: Skin is warm, dry and not diaphoretic.   Psychiatric: Her behavior is normal. Mood, judgment and thought content normal.   Nursing note and vitals reviewed.    Assessment:     1. Vaginal discharge    2. Abdominal pain, unspecified abdominal location        Plan:       Vaginal discharge  -     Chlamydia/GC, PCR  -     Wet Prep, Genital  -     HCG Qualitative Urine    Abdominal pain, unspecified abdominal location  -     HCG Qualitative Urine      - abstain from sex until all results are known  - urine tests take 1 day to result  - vaginal swab tests are a same day result  - blood tests are a same day result  - this clinic will ONLY call you for POSITIVE = ABNORMAL results. We will not call you to tell you tests are NEGATIVE = NORMAL.  - if you need medication to treat any conditions, it was either prescribed to you today and sent to your pharmacy, or it will " Detail Level: Zone be sent when providers view abnormal results.  - if any of your tests are abnormal, we will call you with a plan of care.  - always require condoms  - partners will need treatment for any +STDs on your testing. They have to have their own visit, we do not automatically treat them.

## 2024-05-09 ENCOUNTER — OFFICE VISIT (OUTPATIENT)
Dept: URGENT CARE | Facility: CLINIC | Age: 24
End: 2024-05-09
Payer: MEDICAID

## 2024-05-09 VITALS
HEIGHT: 64 IN | TEMPERATURE: 98 F | RESPIRATION RATE: 18 BRPM | BODY MASS INDEX: 18.81 KG/M2 | SYSTOLIC BLOOD PRESSURE: 112 MMHG | HEART RATE: 75 BPM | WEIGHT: 110.19 LBS | DIASTOLIC BLOOD PRESSURE: 68 MMHG | OXYGEN SATURATION: 100 %

## 2024-05-09 DIAGNOSIS — N92.6 IRREGULAR PERIODS/MENSTRUAL CYCLES: ICD-10-CM

## 2024-05-09 DIAGNOSIS — N89.8 VAGINAL ODOR: Primary | ICD-10-CM

## 2024-05-09 LAB
B-HCG UR QL: NEGATIVE
C TRACH DNA SPEC QL NAA+PROBE: NOT DETECTED
CLUE CELLS VAG QL WET PREP: ABNORMAL
CTP QC/QA: YES
N GONORRHOEA DNA SPEC QL NAA+PROBE: NOT DETECTED
SOURCE (OHS): NORMAL
T VAGINALIS VAG QL WET PREP: ABNORMAL
WBC #/AREA VAG WET PREP: ABNORMAL
YEAST SPEC QL WET PREP: ABNORMAL

## 2024-05-09 PROCEDURE — 87491 CHLMYD TRACH DNA AMP PROBE: CPT | Performed by: STUDENT IN AN ORGANIZED HEALTH CARE EDUCATION/TRAINING PROGRAM

## 2024-05-09 PROCEDURE — 99213 OFFICE O/P EST LOW 20 MIN: CPT | Mod: S$PBB,,, | Performed by: STUDENT IN AN ORGANIZED HEALTH CARE EDUCATION/TRAINING PROGRAM

## 2024-05-09 PROCEDURE — 87210 SMEAR WET MOUNT SALINE/INK: CPT | Performed by: STUDENT IN AN ORGANIZED HEALTH CARE EDUCATION/TRAINING PROGRAM

## 2024-05-09 PROCEDURE — 99214 OFFICE O/P EST MOD 30 MIN: CPT | Mod: PBBFAC | Performed by: STUDENT IN AN ORGANIZED HEALTH CARE EDUCATION/TRAINING PROGRAM

## 2024-05-09 PROCEDURE — 81025 URINE PREGNANCY TEST: CPT | Mod: PBBFAC | Performed by: STUDENT IN AN ORGANIZED HEALTH CARE EDUCATION/TRAINING PROGRAM

## 2024-05-09 RX ORDER — METRONIDAZOLE 65 MG/5G
GEL TOPICAL NIGHTLY
COMMUNITY
Start: 2024-05-02

## 2024-05-09 NOTE — PROGRESS NOTES
"Subjective:      Patient ID: Lefty Mcintosh is a 24 y.o. female.    Vitals:  height is 5' 4.17" (1.63 m) and weight is 50 kg (110 lb 3.2 oz). Her oral temperature is 97.9 °F (36.6 °C). Her blood pressure is 112/68 and her pulse is 75. Her respiration is 18 and oxygen saturation is 100%.     Chief Complaint: bacterial vaginosis (Pt reports that she has had bv for 2 weeks, pt is also concerned about possible stds)    HPI  Lefty Mcintosh is a   24-year-old female presenting to the urgent care clinic today with vaginal discharge that has been going on for the past 2 weeks.  Patient states that she frequently gets yeast/bacterial vaginosis and is treated by her gynecologist.  Patient is also concerned that she may have a sexually transmitted disease.  Patient states that she is currently sexually active with male partners.  She has had 2 partners in the last year, and she does not use any barrier protection.  She denies any abdominal pain.  She denies any vaginal bleeding.  She denies any hematuria, dysuria, urinary frequency.  She denies any fevers or chills.  She denies any other symptoms at this time.  ROS   Objective:     Physical Exam   Constitutional: She is oriented to person, place, and time. She appears well-developed.   HENT:   Head: Normocephalic and atraumatic.   Ears:   Right Ear: External ear normal.   Left Ear: External ear normal.   Nose: Nose normal.   Mouth/Throat: Mucous membranes are normal.   Eyes: Conjunctivae and lids are normal.   Neck: Trachea normal. Neck supple.   Cardiovascular: Normal rate, regular rhythm and normal heart sounds.   Pulmonary/Chest: Effort normal and breath sounds normal. No respiratory distress.   Abdominal: Normal appearance and bowel sounds are normal. She exhibits no distension and no mass. Soft. There is no abdominal tenderness.   Genitourinary:         Comments:   Declined at this time     Musculoskeletal: Normal range of motion.         General: Normal range of motion. "   Neurological: She is alert and oriented to person, place, and time. She has normal strength.   Skin: Skin is warm, dry, intact, not diaphoretic and not pale.   Psychiatric: Her speech is normal and behavior is normal. Judgment and thought content normal.   Nursing note and vitals reviewed.      Assessment:     1. Vaginal odor    2. Irregular periods/menstrual cycles        Plan:       Vaginal odor  -     Wet Prep, Genital  -     Chlamydia/GC, PCR    Irregular periods/menstrual cycles  -     POCT urine pregnancy      This note is dictated using the M*Modal Fluency Direct word recognition program. There are word recognition mistakes that are occasionally missed on review.    Hung Farrar MD

## 2024-08-09 ENCOUNTER — OFFICE VISIT (OUTPATIENT)
Dept: URGENT CARE | Facility: CLINIC | Age: 24
End: 2024-08-09
Payer: MEDICAID

## 2024-08-09 VITALS
BODY MASS INDEX: 19.57 KG/M2 | RESPIRATION RATE: 20 BRPM | HEART RATE: 87 BPM | WEIGHT: 114.63 LBS | DIASTOLIC BLOOD PRESSURE: 70 MMHG | OXYGEN SATURATION: 99 % | TEMPERATURE: 98 F | SYSTOLIC BLOOD PRESSURE: 107 MMHG | HEIGHT: 64 IN

## 2024-08-09 DIAGNOSIS — Z20.2 ENCOUNTER FOR ASSESSMENT OF STD EXPOSURE: ICD-10-CM

## 2024-08-09 DIAGNOSIS — N92.6 MISSED MENSES: Primary | ICD-10-CM

## 2024-08-09 LAB
B-HCG UR QL: NEGATIVE
C TRACH DNA SPEC QL NAA+PROBE: NOT DETECTED
CLUE CELLS VAG QL WET PREP: NORMAL
CTP QC/QA: YES
N GONORRHOEA DNA SPEC QL NAA+PROBE: DETECTED
SOURCE (OHS): ABNORMAL
T VAGINALIS VAG QL WET PREP: NORMAL
WBC #/AREA VAG WET PREP: NORMAL
YEAST SPEC QL WET PREP: NORMAL

## 2024-08-09 PROCEDURE — 87210 SMEAR WET MOUNT SALINE/INK: CPT

## 2024-08-09 PROCEDURE — 99214 OFFICE O/P EST MOD 30 MIN: CPT | Mod: PBBFAC

## 2024-08-09 PROCEDURE — 87591 N.GONORRHOEAE DNA AMP PROB: CPT

## 2024-08-09 PROCEDURE — 87491 CHLMYD TRACH DNA AMP PROBE: CPT

## 2024-08-09 RX ORDER — VALACYCLOVIR HYDROCHLORIDE 500 MG/1
500 TABLET, FILM COATED ORAL 2 TIMES DAILY
COMMUNITY
Start: 2024-05-23

## 2024-08-10 ENCOUNTER — TELEPHONE (OUTPATIENT)
Dept: URGENT CARE | Facility: CLINIC | Age: 24
End: 2024-08-10
Payer: MEDICAID

## 2024-08-10 NOTE — TELEPHONE ENCOUNTER
----- Message from Ga Davis MD sent at 8/9/2024  7:29 PM CDT -----  Please notify patient of positive gonorrhea test.  Patient will need to return to urgent care for treatment.  Avoid sexual activity until fully treated.  Notify all partners within the last 60 days to get tested/treated.  Safe sex precautions.

## 2024-08-12 ENCOUNTER — TELEPHONE (OUTPATIENT)
Dept: URGENT CARE | Facility: CLINIC | Age: 24
End: 2024-08-12
Payer: MEDICAID

## 2024-08-14 ENCOUNTER — OFFICE VISIT (OUTPATIENT)
Dept: URGENT CARE | Facility: CLINIC | Age: 24
End: 2024-08-14
Payer: MEDICAID

## 2024-08-14 VITALS
RESPIRATION RATE: 16 BRPM | DIASTOLIC BLOOD PRESSURE: 74 MMHG | WEIGHT: 114.63 LBS | BODY MASS INDEX: 19.57 KG/M2 | SYSTOLIC BLOOD PRESSURE: 119 MMHG | OXYGEN SATURATION: 99 % | HEIGHT: 64 IN | HEART RATE: 77 BPM | TEMPERATURE: 99 F

## 2024-08-14 DIAGNOSIS — A54.9 GONORRHEA: Primary | ICD-10-CM

## 2024-08-14 PROCEDURE — 99214 OFFICE O/P EST MOD 30 MIN: CPT | Mod: PBBFAC

## 2024-08-14 PROCEDURE — 99213 OFFICE O/P EST LOW 20 MIN: CPT | Mod: S$PBB,,,

## 2024-08-14 PROCEDURE — 25000003 PHARM REV CODE 250

## 2024-08-14 PROCEDURE — 63600175 PHARM REV CODE 636 W HCPCS

## 2024-08-14 RX ORDER — LIDOCAINE HYDROCHLORIDE 10 MG/ML
1.8 INJECTION, SOLUTION EPIDURAL; INFILTRATION; INTRACAUDAL; PERINEURAL
Status: COMPLETED | OUTPATIENT
Start: 2024-08-14 | End: 2024-08-14

## 2024-08-14 RX ORDER — CEFTRIAXONE 500 MG/1
500 INJECTION, POWDER, FOR SOLUTION INTRAMUSCULAR; INTRAVENOUS
Status: COMPLETED | OUTPATIENT
Start: 2024-08-14 | End: 2024-08-14

## 2024-08-14 RX ADMIN — LIDOCAINE HYDROCHLORIDE 18 MG: 10 INJECTION, SOLUTION EPIDURAL; INFILTRATION; INTRACAUDAL; PERINEURAL at 06:08

## 2024-08-14 RX ADMIN — CEFTRIAXONE SODIUM 500 MG: 500 INJECTION, POWDER, FOR SOLUTION INTRAMUSCULAR; INTRAVENOUS at 06:08

## 2024-08-14 NOTE — PATIENT INSTRUCTIONS
Take medicines exactly as prescribed.  If your doctor prescribed antibiotics, take them as directed. Don't stop taking them just because you feel better. You need to take the full course of antibiotics.  Tell your sex partner(s) that they will need treatment. For certain STIs, your doctor may be able to prescribe treatment for your partner(s) also.  Don't have sexual contact while you have symptoms of an STI or are being treated for an STI.  Don't douche. Douching changes the normal balance of bacteria in your vagina. It may increase the risk of spreading the infection to your uterus or other reproductive organs.    Here are some ways to help prevent STIs.  Limit your sex partners. Sex with one partner who has sex only with you can reduce your risk of getting an STI.  Talk with your partner or partners about STIs before you have sex. Find out if they are at risk for an STI. Remember that it's possible to have an STI and not know it.  Wait to have sex with new partners until you've each been tested.  Don't have sex if you have symptoms of an infection or if you are being treated for an STI.  Use a condom every time you have sex. Condoms are the only form of birth control that also helps prevent STIs.  Don't share sex toys. But if you do share them, use a condom and clean the sex toys between each use.  Vaccines are available for some STIs, such as HPV. Ask your doctor for more information.      Seek immediate medical care if:  You have new belly or pelvic pain.  You have a fever.  You have new or increased burning or pain with urination, or you cannot urinate.  You have pain, swelling, or tenderness in the scrotum.  You are pregnant and have any symptoms of an STI.  Watch closely for changes in your health, and be sure to contact your doctor or nurse advice line if:  You have unusual vaginal bleeding.  You have a discharge from the vagina, penis, or anus.  You have any new symptoms, such as sores, bumps, rashes,  blisters, or warts in the genital or anal area.  You have itching, tingling, pain, or burning in the genital or anal area.  You think you may have been exposed to an STI.  You have a sore throat or sores in your mouth or on your tongue.

## 2024-08-14 NOTE — PROGRESS NOTES
"Subjective:       Patient ID: Lefty Mcintosh is a 24 y.o. female.    Vitals:  height is 5' 4" (1.626 m) and weight is 52 kg (114 lb 10.2 oz). Her temperature is 98.6 °F (37 °C). Her blood pressure is 119/74 and her pulse is 77. Her respiration is 16 and oxygen saturation is 99%.     Chief Complaint: STD TX (Called to return for std tx)    24-year-old female reports to the clinic for STD treatment.  Patient reports she was called that she tested positive for gonorrhea on 08/09/2024 and is returning today for treatment.  Discussed STD prevention and safe sex precautions as well as treatment plan with Rocephin shot with patient and she verbalizes understanding.    All other systems are negative    Chart reviewed    Objective:   Physical Exam   Constitutional: She appears well-developed.  Non-toxic appearance. She does not appear ill. No distress.   Cardiovascular: Regular rhythm and normal heart sounds.   Pulmonary/Chest: Effort normal and breath sounds normal.   Abdominal: Bowel sounds are normal. She exhibits no distension. Soft. There is no abdominal tenderness.   Musculoskeletal: Normal range of motion.         General: Normal range of motion.   Skin: Skin is warm, dry and not diaphoretic.   Nursing note and vitals reviewed.      Assessment:     1. Gonorrhea            Plan:   Will notify patient of any positive results on testing and treat accordingly.  Patient can follow all test results on the patient portal.     Please practice safe sex and read patient education material. Follow up with PCP or return to urgent care if symptoms are not improving in several days. Go to the ER if symptoms worsen or new symptoms develop.       Gonorrhea  -     cefTRIAXone injection 500 mg  -     LIDOcaine (PF) 10 mg/ml (1%) injection 18 mg        Please note: This chart was completed via voice to text dictation. It may contain typographical/word recognition errors. If there are any questions, please contact the provider for final " clarification.